# Patient Record
Sex: FEMALE | Race: WHITE | NOT HISPANIC OR LATINO | Employment: FULL TIME | URBAN - METROPOLITAN AREA
[De-identification: names, ages, dates, MRNs, and addresses within clinical notes are randomized per-mention and may not be internally consistent; named-entity substitution may affect disease eponyms.]

---

## 2023-06-02 ENCOUNTER — TELEPHONE (OUTPATIENT)
Dept: GASTROENTEROLOGY | Facility: CLINIC | Age: 48
End: 2023-06-02
Payer: COMMERCIAL

## 2023-06-02 ENCOUNTER — TRANSCRIBE ORDERS (OUTPATIENT)
Dept: OTHER | Age: 48
End: 2023-06-02

## 2023-06-02 DIAGNOSIS — Z12.11 SCREENING FOR COLON CANCER: Primary | ICD-10-CM

## 2023-06-02 NOTE — TELEPHONE ENCOUNTER
Screening Questions  BLUE  KIND OF PREP RED  LOCATION [review exclusion criteria] GREEN  SEDATION TYPE        N Are you active on mychart?       ERICA BARRIENTOS Ordering/Referring Provider?        MEDICA ELECT ESSENTIAL What type of coverage do you have?      N Have you had a positive covid test in the last 14 days?     29.2  1. BMI  [BMI 40+ - review exclusion criteria& smart-phrase document]    Y  2. Are you able to give consent for your medical care? [IF NO,RN REVIEW]          N  3. Are you taking any prescription pain medications on a routine schedule   (ex narcotics: oxycodone, roxicodone, oxycontin,  and percocet)? [RN Review]          3a. EXTENDED PREP What kind of prescription?     N 4. Do you have any chemical dependencies such as alcohol, street drugs, or methadone?        **If yes 3- 5 , please schedule with MAC sedation.**          IF YES TO ANY 6 - 10 - HOSPITAL SETTING ONLY.     N 6.   Do you need assistance transferring?     N 7.   Have you had a heart or lung transplant?    N 8.   Are you currently on dialysis?   N 9.   Do you use daily home oxygen?   N 10. Do you take nitroglycerin?   10a.  If yes, how often?     N 11. Are you currently pregnant?    11a.  If yes, how many weeks? [ Greater than 12 weeks, OR NEEDED]    N 12. Do you have Pulmonary Hypertension? *NEED PAC APPT AT [facility] w/ [facility]*     N 13. [review exclusion criteria]  Do you have any implantable devices in your body (pacemaker, defib, LVAD)?    N 14. In the past 6 months, have you had any heart related issues including cardiomyopathy or heart attack?     14a.  If yes, did it require cardiac stenting if so when?     N 15. Have you had a stroke or Transient ischemic attack (TIA - aka  mini stroke ) within 6 months?      N 16. Do you have mod to severe Obstructive Sleep Apnea?  [Hospital only]    N 17. Do you have SEVERE AND UNCONTROLLED asthma? *NEED PAC APPT AT [facility] w/[facility]*     18.Do you take blood thinners?  No    N 19. Do you take any of  "the following medications?    NPhentermine    NOzempic    NWegovy (Semaglutide)      19a. If yes, \"Hold for 7 days before procedure.  Please consult your prescribing provider if you have questions about holding this medication.\"     N  20. Do you have chronic kidney disease?      N  21. Do you have a diagnosis of diabetes?     N  22. On a regular basis do you go 3-5 days between bowel movements?      23. Preferred LOCAL Pharmacy for Pre Prescription      [facility]      - CLOSING REMINDERS -    You will receive a call from a Nurse to review instructions and health history.  This assessment must be completed prior to your procedure.  Failure to complete the Nurse assessment may result in the procedure being cancelled.      On the day of your procedure, please designatean adult(s) who can drive you home stay with you for the next 24 hours. The medicines used in the exam will make you sleepy. You will not be able to drive.      You cannot take public transportation, ride share services, or non-medical taxi service without a responsible caregiver.  Medical transport services are allowed with the requirement that a responsible caregiver will receive you at your destination.  We require that drivers and caregivers are confirmed prior to your procedure.      - SCHEDULING DETAILS -  NO &  Hospital Setting Required & If yes, what is the exclusion?   ARTEM  Surgeon    7/24  Date of Procedure  Lower Endoscopy [Colonoscopy]  Type of Procedure Scheduled  [facility] Location   MIRALAX GATORADE WITHOUT MAGNEISUM CITRATE Which Colonoscopy Prep was Sent?     CS Sedation Type     N PAC / Pre-op Required     "

## 2023-06-06 ENCOUNTER — ANCILLARY PROCEDURE (OUTPATIENT)
Dept: MAMMOGRAPHY | Facility: CLINIC | Age: 48
End: 2023-06-06
Attending: NURSE PRACTITIONER
Payer: COMMERCIAL

## 2023-06-06 DIAGNOSIS — Z12.31 SCREENING MAMMOGRAM FOR BREAST CANCER: ICD-10-CM

## 2023-06-06 PROCEDURE — 77067 SCR MAMMO BI INCL CAD: CPT | Performed by: RADIOLOGY

## 2023-07-12 ENCOUNTER — TELEPHONE (OUTPATIENT)
Dept: GASTROENTEROLOGY | Facility: CLINIC | Age: 48
End: 2023-07-12
Payer: COMMERCIAL

## 2023-07-12 NOTE — TELEPHONE ENCOUNTER
Communication requested via unencrypted email. This information includes prep instructions for upcoming procedure.  has acknowledged and agreed to accept the risks to receive unencrypted communications for this incidence. Email sent to: [email or web address]    Pre assessment completed for upcoming procedure.   (Please see previous telephone encounter notes for complete details)    Patient  returned call.       Procedure details:    Arrival time and facility location reviewed    Pre op exam needed? N/A    Designated  policy reviewed. Instructed to have someone stay 6 hours post procedure.     COVID policy reviewed.      Medication review:    Medications reviewed. Please see supporting documentation below. Holding recommendations discussed (if applicable).       Prep for procedure:     Reviewed procedure prep instructions.       Additional information needed?  N/A      Patient  verbalized understanding and had no questions or concerns at this time.      Ivana Romero RN  Endoscopy Procedure Pre Assessment RN  [phone] option 4

## 2023-07-12 NOTE — TELEPHONE ENCOUNTER
Pre visit planning completed.      Procedure details:    Patient scheduled for Colonoscopy  on 7/24/23.     Arrival time: 0840. Procedure time 0940    Pre op exam needed? N/A    Facility location: [facility]; [address] [place] [address], [address] Floor, [place] [id number]    Sedation type: Conscious sedation     Indication for procedure: screening       Chart review:     Electronic implanted devices? No    Diabetic? No      Medication review:    Anticoagulants? No    NSAIDS? No    Other medication HOLDING recommendations:  N/A      Prep for procedure:     Bowel prep recommendation: Miralax prep without magnesium citrate   Due to:  standard bowel prep.    Prep instructions sent via letter at time of scheduling.        Kalpana Pace RN  Endoscopy Procedure Pre Assessment RN  [phone] option 4

## 2023-07-12 NOTE — TELEPHONE ENCOUNTER
Attempted to contact patient in order to complete pre assessment questions.     Patient currently busy and unable to talk. Patient will call back to [phone] option #4.     Leslie Adams RN  Endoscopy Procedure Pre Assessment RN

## 2023-07-24 ENCOUNTER — HOSPITAL ENCOUNTER (OUTPATIENT)
Facility: CLINIC | Age: 48
Discharge: HOME | End: 2023-07-24
Attending: INTERNAL MEDICINE | Admitting: INTERNAL MEDICINE
Payer: COMMERCIAL

## 2023-07-24 VITALS
RESPIRATION RATE: 16 BRPM | OXYGEN SATURATION: 98 % | HEART RATE: 68 BPM | DIASTOLIC BLOOD PRESSURE: 66 MMHG | SYSTOLIC BLOOD PRESSURE: 103 MMHG

## 2023-07-24 LAB — COLONOSCOPY: NORMAL

## 2023-07-24 PROCEDURE — 250N000011 HC RX IP 250 OP 636: Performed by: INTERNAL MEDICINE

## 2023-07-24 PROCEDURE — 45378 DIAGNOSTIC COLONOSCOPY: CPT | Performed by: INTERNAL MEDICINE

## 2023-07-24 PROCEDURE — G0500 MOD SEDAT ENDO SERVICE >5YRS: HCPCS | Performed by: INTERNAL MEDICINE

## 2023-07-24 PROCEDURE — G0121 COLON CA SCRN NOT HI RSK IND: HCPCS | Performed by: INTERNAL MEDICINE

## 2023-07-24 RX ORDER — FENTANYL CITRATE 50 UG/ML
INJECTION, SOLUTION INTRAMUSCULAR; INTRAVENOUS PRN
Status: DISCONTINUED | OUTPATIENT
Start: 2023-07-24 | End: 2023-07-24 | Stop reason: HOSPADM

## 2023-07-24 ASSESSMENT — ACTIVITIES OF DAILY LIVING (ADL)
ADLS_ACUITY_SCORE: 35
ADLS_ACUITY_SCORE: 35

## 2023-12-02 ENCOUNTER — OFFICE VISIT (OUTPATIENT)
Dept: URGENT CARE | Facility: URGENT CARE | Age: 48
End: 2023-12-02
Payer: COMMERCIAL

## 2023-12-02 VITALS
WEIGHT: 180 LBS | HEART RATE: 100 BPM | TEMPERATURE: 96.2 F | OXYGEN SATURATION: 100 % | DIASTOLIC BLOOD PRESSURE: 87 MMHG | RESPIRATION RATE: 16 BRPM | SYSTOLIC BLOOD PRESSURE: 146 MMHG

## 2023-12-02 DIAGNOSIS — L08.9 LOCAL INFECTION OF SKIN AND SUBCUTANEOUS TISSUE: Primary | ICD-10-CM

## 2023-12-02 PROCEDURE — 99203 OFFICE O/P NEW LOW 30 MIN: CPT | Performed by: NURSE PRACTITIONER

## 2023-12-02 RX ORDER — DOXYCYCLINE HYCLATE 100 MG
100 TABLET ORAL 2 TIMES DAILY
Qty: 20 TABLET | Refills: 0 | Status: SHIPPED | OUTPATIENT
Start: 2023-12-02 | End: 2024-09-05

## 2023-12-02 ASSESSMENT — ENCOUNTER SYMPTOMS
CHILLS: 0
NAUSEA: 0
WHEEZING: 0
VOMITING: 0
FEVER: 0
COUGH: 0
DIARRHEA: 0
SHORTNESS OF BREATH: 0

## 2023-12-02 NOTE — PROGRESS NOTES
Assessment & Plan     Local infection of skin and subcutaneous tissue  - doxycycline hyclate (VIBRA-TABS) 100 MG tablet  Dispense: 20 tablet; Refill: 0  - NP discussed verbally warm packing the area three times a day   - Patient shower daily to apply warm and keep clean  - If increased redness, swelling or drainage to return to clinic 24 to 48 hours  - Adult General Surg Referral    Return in about 2 days (around 12/4/2023).    [facility] C[facility] [place]    Kostas Faulkner is a 48 year old female who presents to clinic today for the following health issues: Patient states she has had a tender lump on the mid center of the back that has increased size over the last week. Patient has applied warm pack to the area. Patient     Patient noted tender to the touch stayed the same left side of her neck 11/30/2023 last Thursday.  Chief Complaint   Patient presents with    Mass     Bump located mid/center of back,tender for a week and grown in that amount of time,bump size of a grape     Localized skin infection   Onset of swollen area  was 1 week(s) ago.   Course of illness is gradual onset.  Severity moderately severe  Current and Associated symptoms: painful and red   Location of the rash: mid/center of back   Previous history of a similar rash? No  Recent exposure history: none known  Denies exposure to: none known  Associated symptoms include: localized redness and tenderness  Patient denied throat tightness, sore throat, wheezing, shortness of breath, tongue/lip swelling, rhinorrhea, fever, cough, joint pain, nausea, and vomiting.  Treatment measures tried include: applied warm pack    Review of Systems   Constitutional:  Negative for chills and fever.   Respiratory:  Negative for cough, shortness of breath and wheezing.    Cardiovascular:  Negative for chest pain.   Gastrointestinal:  Negative for diarrhea, nausea and vomiting.   Skin:  Negative for rash.         Objective    BP  (!) 146/87   Pulse 100   Temp (!) 96.2  F (35.7  C) (Tympanic)   Resp 16   Wt 81.6 kg (180 lb)   SpO2 100%   Physical Exam  Vitals and nursing note reviewed.   Constitutional:       Appearance: Normal appearance.   HENT:      Head: Normocephalic and atraumatic.   Eyes:      Conjunctiva/sclera: Conjunctivae normal.   Cardiovascular:      Rate and Rhythm: Normal rate and regular rhythm.   Pulmonary:      Effort: Pulmonary effort is normal.      Breath sounds: Normal breath sounds.   Lymphadenopathy:      Cervical: No cervical adenopathy.   Skin:     Comments: Mid thoracic with induration of 4 cm by 2.5 cm within this area is 1.5 cn by 1.5 cm raised erythema with white area non fluctuant    Neurological:      Mental Status: She is alert.

## 2023-12-02 NOTE — PATIENT INSTRUCTIONS
Monitor for signs and symptoms of infection redness swelling and drainage in 24 to 48 hours   
[facility] Smokers Quitline ([id number])

## 2023-12-04 NOTE — TELEPHONE ENCOUNTER
REFERRAL INFORMATION:  Referring Provider: Isabelle Key NP  Referring Clinic: [facility] - Urgent Care  Reason for Visit/Diagnosis: Local infection of skin and subcutaneous tissue       FUTURE VISIT INFORMATION:  Appointment Date: 12/8/2023  Appointment Time: 9 AM     NOTES RECORD STATUS  DETAILS   OFFICE NOTE from Referring Provider Internal [facility]:  12/2/23 -  OV with Isabelle Key NP   OFFICE NOTE from Other Specialists N/A    HOSPITAL DISCHARGE SUMMARY/ ED VISITS  N/A    OPERATIVE REPORT N/A

## 2023-12-07 ENCOUNTER — PATIENT OUTREACH (OUTPATIENT)
Dept: SURGERY | Facility: CLINIC | Age: 48
End: 2023-12-07
Payer: COMMERCIAL

## 2023-12-07 NOTE — PROGRESS NOTES
Patient Telephone Reminder Call    Date of call:  12/07/23  Phone numbers:  Cell number on file:    Telephone Information:   Mobile [phone]       Reached patient/confirmed appointment:  Yes  Appointment with:   Dr. Laurie Enriquez  Reason for visit:  consult for localize skin infection, possible I&D  Remind patient that this visit is a consultation only, NO procedure:  No  Can this visit be changed to a video visit:  No

## 2023-12-08 ENCOUNTER — OFFICE VISIT (OUTPATIENT)
Dept: SURGERY | Facility: CLINIC | Age: 48
End: 2023-12-08
Attending: NURSE PRACTITIONER
Payer: COMMERCIAL

## 2023-12-08 ENCOUNTER — PRE VISIT (OUTPATIENT)
Dept: SURGERY | Facility: CLINIC | Age: 48
End: 2023-12-08

## 2023-12-08 VITALS
BODY MASS INDEX: 31.04 KG/M2 | SYSTOLIC BLOOD PRESSURE: 130 MMHG | HEIGHT: 64 IN | HEART RATE: 83 BPM | WEIGHT: 181.8 LBS | DIASTOLIC BLOOD PRESSURE: 89 MMHG | OXYGEN SATURATION: 100 %

## 2023-12-08 DIAGNOSIS — L08.9 LOCAL INFECTION OF SKIN AND SUBCUTANEOUS TISSUE: ICD-10-CM

## 2023-12-08 PROCEDURE — 10060 I&D ABSCESS SIMPLE/SINGLE: CPT | Performed by: SURGERY

## 2023-12-08 ASSESSMENT — PAIN SCALES - GENERAL: PAINLEVEL: NO PAIN (0)

## 2023-12-08 NOTE — LETTER
Date:December 28, 2023      Provider requested that no letter be sent. Do not send.       [facility]

## 2023-12-08 NOTE — PATIENT INSTRUCTIONS
You met with Dr. Shan Cheng.      Today's visit instructions:     We will call you Monday or Tuesday for a status update.     If you have questions please contact Surekha RN or Courtney RN during regular clinic hours, Monday through Friday 7:30 AM - 4:00 PM, or you can contact us via [employer] at anytime.       If you have urgent needs after-hours, weekends, or holidays please call the hospital at [phone] and ask to speak with our on-call General Surgery Team.    Appointment [phone]  Nurse Advice (Surekha or Courtney): [phone]   Surgery Scheduler (Faith): [phone]  Fax: [phone]    Mass/Lump Removal        Incision care   You may take a shower the day after surgery. Carefully wash your incision with soap and water. Do not submerge yourself in water (bath, whirlpool, hot tub, pool, lake) for 14 days after surgery.   Remove the gauze bandage 1-2 days after surgery.  Always wash your hands before touching your incisions or removing bandages.   Reapply a new dressing after taking a shower. Do this daily and as needed until healed.      Other medicines   Wait to start aspirin or blood thinners until the day after surgery. You can continue your regular medicines at your normal time the day after surgery.   Your pain medicine may cause constipation (hard, dry stools). To help with this, take the stool softener your doctor gave you or an over-the-counter stool softener or laxative. You can stop taking this when you are no longer taking pain medicine and your bowel movements are back to normal.      For pain or discomfort   Please use over-the-counter medication, use acetaminophen (Tylenol) or ibuprofen (Advil, Motrin) as instructed on the box for discomfort. If you were prescribed narcotic pain medicine, take as needed and as instructed on the bottle (narcotics are not always prescribed for this procedure). Do not take Tylenol if it is in your narcotic pain medication.    Use an ice pack on your  surgical cut (incision) for 20 minutes at a time as needed for the first 24 hours. Be sure to protect your skin by putting a cloth between the ice pack and your skin.      When to call the doctor   Call your doctor if you have:   A fever above 101 F (38.3 C) (taken under the tongue), or a fever or chills lasting more than a day.   Redness at the incision site.   Any fluid or blood draining from the incision, especially if it smells bad.    Severe pain that doesn t improve with pain medicine.

## 2023-12-08 NOTE — NURSING NOTE
"Chief Complaint   Patient presents with    New Patient     Local infection of skin and subcutaneous tissue       Vitals:    12/08/23 0904   BP: 130/89   BP Location: Left arm   Patient Position: Sitting   Cuff Size: Adult Regular   Pulse: 83   SpO2: 100%   Weight: 82.5 kg (181 lb 12.8 oz)   Height: 1.626 m (5' 4\")       Body mass index is 31.21 kg/m .                          Dave Rojas, EMT    "

## 2023-12-08 NOTE — NURSING NOTE
[facility] [address] [address]  [phone]  Dept: [phone]  ______________________________________________________________________________    Patient: Lucie Robertson   : 1975   MRN: [id number]   2023    INVASIVE PROCEDURE SAFETY CHECKLIST    Date: 2023   Procedure: incision and drainage of back abscess  Patient Name: Lucie Robertson  MRN: [id number]  YOB: 1975    Action: Complete sections as appropriate. Any discrepancy results in a HARD COPY until resolved.     PRE PROCEDURE:  Patient ID verified with 2 identifiers (name and  or MRN): Yes  Procedure and site verified with patient/designee (when able): Yes  Accurate consent documentation in medical record: Yes  H&P (or appropriate assessment) documented in medical record: NA  H&P must be up to 30 days prior to procedure and updates within 24 hours of procedure as applicable: NA  Relevant diagnostic and radiology test results appropriately labeled and displayed as applicable: NA  Blood products, implants, devices, and or special equipment available for the procedure as applicable: NA   Procedure site(s) marked with provider initials: NA  Marking not required: Yes  Procedure does not require site marking    TIMEOUT:  Time-Out performed immediately prior to starting procedure, including verbal and active participation of all team members addressing the following:Yes  * Correct patient identify  * Confirmed that the correct side and site are marked  * An accurate procedure consent form  * Agreement on the procedure to be done  * Correct patient position  * Relevant images and results are properly labeled and appropriately displayed  * The need to administer antibiotics or fluids for irrigation purposes during the procedure as applicable   * Safety precautions based on patient history or medication use    DURING PROCEDURE: Verification of  correct person, site, and procedures any time the responsibility for care of the patient is transferred to another member of the care team.

## 2023-12-08 NOTE — NURSING NOTE
Patient was informed that she may need to have the cyst completely excised at some point once the inflammation/infection resolves. She will call the Nurse Advice line if she decides to proceed with this.

## 2023-12-12 ENCOUNTER — PATIENT OUTREACH (OUTPATIENT)
Dept: SURGERY | Facility: CLINIC | Age: 48
End: 2023-12-12
Payer: COMMERCIAL

## 2023-12-12 NOTE — PROGRESS NOTES
RN Post-Op/Post-Discharge Care Coordination Note    Ms. Lucie Robertson is a 48 year old female who underwent incision and drainage of back abscess on 12/8 with  Dr. Shan Cheng.  Spoke with Patient.    Support  Patient able to care for self independently.     Health Status  Fevers/chills: Patient denies any fever or chills.  Incisions: Patient denies any signs and symptoms of infection. Cleansing the incision daily in the shower and applying cover dressing. Incision is healing.   Pain: denies pain  New Medications:  None    Activity/Restrictions  No restrictions    Equipment  None    Pathology reviewed with patient:  N/A    Forms/Letters  No    All of her questions were answered including reviewing wound care.  She will call this office if she has any further questions and/or concerns.  She will call if she decides to have the back mass excised once the area is completely healed.     A copy of this note was routed to the primary surgeon.      Whom and When to Call  Patient acknowledges understanding of how to manage any medication changes and when to seek medical care.     Patient advised that if after hour medical concerns arise to please call [phone] and choose option 4 to speak to the physician on call.

## 2023-12-12 NOTE — PROCEDURES
[unfilled]    Incision and drainage    Date/Time: 12/8/2023 9:00 AM    Performed by: Shan Cheng MD  Authorized by: Laurie Enriquez MD      UNIVERSAL PROTOCOL   Site Marked: No  Prior Images Obtained and Reviewed:  No  Required items: Required blood products, implants, devices and special equipment available    Patient identity confirmed:  Verbally with patient  NA - No sedation, light sedation, or local anesthesia  Confirmation Checklist:  Patient's identity using two indicators  Time out: Immediately prior to the procedure a time out was called    Universal Protocol: the Joint Commission Universal Protocol was followed    Preparation: Patient was prepped and draped in usual sterile fashion    ESBL (mL):  1    ANESTHESIA  local infiltration  Local anesthesia used: yes  Local Anesthetic: lidocaine 1% without epinephrine  Anesthetic total: 5 mL      SEDATION    Patient Sedated: No      Type: abscess (abscess (infected cyst))  Location: middle back.  Scalpel size: 11  Incision type: single straight  Incision depth: dermal  Complexity: simple  Drainage: purulent  Drainage amount: moderate  Wound treatment: wound left open  Packing material: 1/4 in gauze      PROCEDURE  Describe Procedure: Patient presented with mid back abscess from infected cyst. After injection of local anesthetic, incision made over area of fluctuance and purulent material expressed. The wound was then irrigated and packed. There were no immediate complications.  : none.  Length of time physician/provider present for 1:1 monitoring during sedation: 0 (no sedation used)

## 2023-12-12 NOTE — PROGRESS NOTES
"  Assessment & Plan   Problem List Items Addressed This Visit    None  Visit Diagnoses       Local infection of skin and subcutaneous tissue        Relevant Medications    lidocaine 1 % 10 mL (Completed)    Other Relevant Orders    Incision and drainage (Completed)             Review of external notes as documented elsewhere in note         BMI:   Estimated body mass index is 31.21 kg/m  as calculated from the following:    Height as of this encounter: 1.626 m (5' 4\").    Weight as of this encounter: 82.5 kg (181 lb 12.8 oz).           No follow-ups on file.    JANET KRAUSE MD  [facility] DEMETRI Kincaid is a 48 year old, presenting for the following health issues:  New Patient (Local infection of skin and subcutaneous tissue)    HPI     Over the Thanksgiving holiday the patient noticed a \"bump\" on her lower mid back. She complains it has been tender and painful. A couple of days ago it started draining pus. Per patient and chart review, she saw her PCP who prescribed doxycycline and referred her to surgery clinic for incision and drainage due to the location over her spine. She says since she started taking the antibiotic the infection has improved some, but is still draining purulent fluid. She denies systemic          Objective    /89 (BP Location: Left arm, Patient Position: Sitting, Cuff Size: Adult Regular)   Pulse 83   Ht 1.626 m (5' 4\")   Wt 82.5 kg (181 lb 12.8 oz)   SpO2 100%   BMI 31.21 kg/m    Body mass index is 31.21 kg/m .  Physical Exam   GENERAL: healthy, alert and no distress  NECK: no adenopathy, no asymmetry, masses, or scars and thyroid normal to palpation  RESP: lungs clear to auscultation - no rales, rhonchi or wheezes  CV: regular rate and rhythm, normal S1 S2, no S3 or S4, no murmur, click or rub, no peripheral edema and peripheral pulses strong  ABDOMEN: soft, nontender, no hepatosplenomegaly, no masses and bowel sounds normal  MS: " no gross musculoskeletal defects noted, no edema  BACK: approx. 1.5 x 1.5 cm skin abscess in lower mid back c/w infected cyst            A&P: Patient presents with an infected cyst on her back. She has no signs or symptoms of systemic infection. This is an infected dermal cyst, there is no concern on exam for underlying involvement beyond the skin. Recommend incision & drainage under local anesthetic. Risks/benefits/alternatives dicussed with patient, she elects to proceed with I&D, informed consent obtained. Counseled her to go ahead and complete her antibiotics course and that once this is healed she may wish to have the cyst excised to prevent recurrent abscesses.

## 2023-12-31 ENCOUNTER — HEALTH MAINTENANCE LETTER (OUTPATIENT)
Age: 48
End: 2023-12-31

## 2024-06-10 ENCOUNTER — ANCILLARY PROCEDURE (OUTPATIENT)
Dept: MAMMOGRAPHY | Facility: CLINIC | Age: 49
End: 2024-06-10
Payer: COMMERCIAL

## 2024-06-10 DIAGNOSIS — Z12.31 VISIT FOR SCREENING MAMMOGRAM: ICD-10-CM

## 2024-06-10 PROCEDURE — 77067 SCR MAMMO BI INCL CAD: CPT | Mod: TC | Performed by: STUDENT IN AN ORGANIZED HEALTH CARE EDUCATION/TRAINING PROGRAM

## 2024-06-10 PROCEDURE — 77063 BREAST TOMOSYNTHESIS BI: CPT | Mod: TC | Performed by: STUDENT IN AN ORGANIZED HEALTH CARE EDUCATION/TRAINING PROGRAM

## 2024-06-17 ENCOUNTER — ANCILLARY PROCEDURE (OUTPATIENT)
Dept: MAMMOGRAPHY | Facility: CLINIC | Age: 49
End: 2024-06-17
Attending: FAMILY MEDICINE
Payer: COMMERCIAL

## 2024-06-17 DIAGNOSIS — R92.8 ABNORMAL MAMMOGRAM: ICD-10-CM

## 2024-06-17 PROCEDURE — 77065 DX MAMMO INCL CAD UNI: CPT | Mod: RT | Performed by: RADIOLOGY

## 2024-06-17 PROCEDURE — G0279 TOMOSYNTHESIS, MAMMO: HCPCS | Performed by: RADIOLOGY

## 2024-06-17 PROCEDURE — 76642 ULTRASOUND BREAST LIMITED: CPT | Mod: RT | Performed by: RADIOLOGY

## 2024-06-19 ENCOUNTER — ANCILLARY PROCEDURE (OUTPATIENT)
Dept: MAMMOGRAPHY | Facility: CLINIC | Age: 49
End: 2024-06-19
Attending: FAMILY MEDICINE
Payer: COMMERCIAL

## 2024-06-19 DIAGNOSIS — R92.8 ABNORMAL MAMMOGRAM: ICD-10-CM

## 2024-06-19 PROCEDURE — 88342 IMHCHEM/IMCYTCHM 1ST ANTB: CPT | Mod: 26 | Performed by: STUDENT IN AN ORGANIZED HEALTH CARE EDUCATION/TRAINING PROGRAM

## 2024-06-19 PROCEDURE — 88305 TISSUE EXAM BY PATHOLOGIST: CPT | Mod: 26 | Performed by: STUDENT IN AN ORGANIZED HEALTH CARE EDUCATION/TRAINING PROGRAM

## 2024-06-19 PROCEDURE — 19083 BX BREAST 1ST LESION US IMAG: CPT | Mod: RT | Performed by: RADIOLOGY

## 2024-06-19 PROCEDURE — 88305 TISSUE EXAM BY PATHOLOGIST: CPT | Mod: TC | Performed by: FAMILY MEDICINE

## 2024-06-21 ENCOUNTER — TELEPHONE (OUTPATIENT)
Dept: MAMMOGRAPHY | Facility: CLINIC | Age: 49
End: 2024-06-21
Payer: COMMERCIAL

## 2024-06-21 NOTE — TELEPHONE ENCOUNTER
Spoke to Codi about the benign finding of a fibroadenoma.  We discussed the Radiologist's recommendation of continuing on with her yearly screening mammograms.  Codi verbalized understanding.

## 2024-06-24 LAB
PATH REPORT.ADDENDUM SPEC: NORMAL
PATH REPORT.COMMENTS IMP SPEC: NORMAL
PATH REPORT.COMMENTS IMP SPEC: NORMAL
PATH REPORT.FINAL DX SPEC: NORMAL
PATH REPORT.GROSS SPEC: NORMAL
PATH REPORT.MICROSCOPIC SPEC OTHER STN: NORMAL
PATH REPORT.RELEVANT HX SPEC: NORMAL
PHOTO IMAGE: NORMAL

## 2024-07-08 ENCOUNTER — ANCILLARY PROCEDURE (OUTPATIENT)
Dept: CT IMAGING | Facility: CLINIC | Age: 49
End: 2024-07-08
Attending: FAMILY MEDICINE
Payer: COMMERCIAL

## 2024-07-08 DIAGNOSIS — R51.9 FACIAL PAIN: ICD-10-CM

## 2024-07-08 PROCEDURE — 70486 CT MAXILLOFACIAL W/O DYE: CPT | Mod: GC | Performed by: RADIOLOGY

## 2024-07-23 ENCOUNTER — MEDICAL CORRESPONDENCE (OUTPATIENT)
Dept: HEALTH INFORMATION MANAGEMENT | Facility: CLINIC | Age: 49
End: 2024-07-23
Payer: COMMERCIAL

## 2024-07-25 ENCOUNTER — TRANSCRIBE ORDERS (OUTPATIENT)
Dept: OTHER | Age: 49
End: 2024-07-25

## 2024-07-25 DIAGNOSIS — Z84.81 FAMILY HISTORY OF GENETIC DISEASE CARRIER: Primary | ICD-10-CM

## 2024-08-29 ENCOUNTER — NURSE TRIAGE (OUTPATIENT)
Dept: NURSING | Facility: CLINIC | Age: 49
End: 2024-08-29
Payer: COMMERCIAL

## 2024-08-30 NOTE — TELEPHONE ENCOUNTER
"Codi went for a run tonight around 8 pm.  When she was cooling down she noticed a nagging pain between her breasts.  Got home and fine.  Now is still having pain in chest towards the arm pit.    Reason for Disposition   Dizziness or lightheadedness    Additional Information   Negative: SEVERE difficulty breathing (e.g., struggling for each breath, speaks in single words)   Negative: Difficult to awaken or acting confused (e.g., disoriented, slurred speech)   Negative: Shock suspected (e.g., cold/pale/clammy skin, too weak to stand, low BP, rapid pulse)   Negative: Passed out (i.e., lost consciousness, collapsed and was not responding)   Negative: Chest pain lasting longer than 5 minutes and ANY of the following:    history of heart disease  (i.e., heart attack, bypass surgery, angina, angioplasty, CHF; not just a heart murmur)    described as crushing, pressure-like, or heavy    age > 50    age > 30 AND at least one cardiac risk factor (i.e., hypertension, diabetes, obesity, smoker or strong family history of heart disease)    not relieved with nitroglycerin   Negative: Heart beating < 50 beats per minute OR > 140 beats per minute   Negative: Visible sweat on face or sweat dripping down face   Negative: Sounds like a life-threatening emergency to the triager   Negative: Followed a chest injury   Negative: SEVERE chest pain   Negative: [1] Chest pain (or \"angina\") comes and goes AND [2] is happening more often (increasing in frequency) or getting worse (increasing in severity)  (Exception: Chest pains that last only a few seconds.)   Negative: Pain also in shoulder(s) or arm(s) or jaw  (Exception: Pain is clearly made worse by movement.)   Negative: Difficulty breathing    Protocols used: Chest Pain-A-AH    "

## 2024-09-05 ENCOUNTER — OFFICE VISIT (OUTPATIENT)
Dept: URGENT CARE | Facility: URGENT CARE | Age: 49
End: 2024-09-05
Payer: COMMERCIAL

## 2024-09-05 VITALS
DIASTOLIC BLOOD PRESSURE: 94 MMHG | BODY MASS INDEX: 31.76 KG/M2 | SYSTOLIC BLOOD PRESSURE: 150 MMHG | WEIGHT: 185 LBS | TEMPERATURE: 98 F | OXYGEN SATURATION: 100 % | HEART RATE: 84 BPM

## 2024-09-05 DIAGNOSIS — M79.652 PAIN OF LEFT THIGH: Primary | ICD-10-CM

## 2024-09-05 PROCEDURE — 99214 OFFICE O/P EST MOD 30 MIN: CPT | Performed by: NURSE PRACTITIONER

## 2024-09-06 NOTE — PROGRESS NOTES
Assessment & Plan   Pain of left thigh       Recommend further evaluation in emergency room for left thigh pain new onset as cannot rule out DVT in urgent care. Patient agreeable and is discharged in stable condition.         Jenifer Mccord NP  [facility]    Kostas Kincaid is a 49 year old female who presents to clinic today with her mom for the following health issues:  Chief Complaint   Patient presents with    Musculoskeletal Problem     Left Inner thigh hurt when walking or pressing hard on it.     MS Injury/Pain    Onset of symptoms was earlier today  Location: left inner thigh  Context: No injury  Course of symptoms is same.    Severity moderate  Current and Associated symptoms: Pain did have redness after applying heating pad and for several hours afterwards  Denies  Swelling, Bruising, Tenderness, and Decreased range of motion  Aggravating Factors: walking and pressing on it  Therapies to improve symptoms include: heating pad   This is the first time this type of problem has occurred for this patient.   Denies shortness of breath.   She is wondering if it could be a blood clot  No increase in physical activity is a runner and has not run too recently.     Problem list, Medication list, Allergies, and Medical history reviewed in EPIC.    ROS:  Review of systems negative except for noted above        Objective    BP (!) 150/94 (BP Location: Right arm, Patient Position: Sitting, Cuff Size: Adult Large)   Pulse 84   Temp 98  F (36.7  C) (Tympanic)   Wt 83.9 kg (185 lb)   SpO2 100%   BMI 31.76 kg/m    Physical Exam  Constitutional:       General: She is not in acute distress.     Appearance: She is not toxic-appearing or diaphoretic.   Pulmonary:      Effort: Pulmonary effort is normal. No respiratory distress.   Musculoskeletal:      Right lower leg: No edema.      Left lower leg: No edema.      Comments: Tenderness with palpation left inner thigh   Skin:     General:  Skin is warm and dry.      Findings: No bruising or erythema.   Neurological:      Mental Status: She is alert.      Sensory: No sensory deficit.

## 2024-09-13 ENCOUNTER — OFFICE VISIT (OUTPATIENT)
Dept: FAMILY MEDICINE | Facility: CLINIC | Age: 49
End: 2024-09-13
Payer: COMMERCIAL

## 2024-09-13 ENCOUNTER — MEDICAL CORRESPONDENCE (OUTPATIENT)
Dept: HEALTH INFORMATION MANAGEMENT | Facility: CLINIC | Age: 49
End: 2024-09-13

## 2024-09-13 VITALS
HEART RATE: 62 BPM | SYSTOLIC BLOOD PRESSURE: 145 MMHG | RESPIRATION RATE: 16 BRPM | DIASTOLIC BLOOD PRESSURE: 91 MMHG | TEMPERATURE: 98 F | OXYGEN SATURATION: 100 %

## 2024-09-13 DIAGNOSIS — R20.2 FACIAL TINGLING: Primary | ICD-10-CM

## 2024-09-13 PROCEDURE — 99203 OFFICE O/P NEW LOW 30 MIN: CPT

## 2024-09-13 RX ORDER — LEVOFLOXACIN 500 MG/1
TABLET, FILM COATED ORAL
COMMUNITY
Start: 2024-09-13

## 2024-09-14 NOTE — PROGRESS NOTES
Assessment & Plan       ICD-10-CM    1. Facial tingling  R20.2 Adult Neurology  Referral         Codi is having ongoing sinus issues that are being managed by her primary team. She's starting Levofloxacin tomorrow for the recurrent symptoms. Intermittently over the summer she's been having left sided facial tingling. Low concern for stroke given lack of weakness, speech changes, vision changes, the intermittent nature, and patient's overall health. Considered temporal arteritis, trigeminal neuralgia, and other facial pathologies that can cause nerve symptoms - less likely given it's the entire side of the face and there is no pain or electric sensation and no headaches. Considered less common systemic illnesses like MS, which are certainly possible, but this seems to have started with and continued with the sinus symptoms, so I'm leaning towards this being related to inflammation from this ongoing sinus issue. However, if the tingling persists or she gets any new or changing symptoms, I would want her seen by neurology. Referral placed and will schedule.     Follow up with primary care provider with any problems, questions or concerns or if symptoms worsen or fail to improve. Patient agreed to plan and verbalized understanding.     Subjective     Codi is a 49 year old female who presents to clinic today for the following health issues:  Chief Complaint   Patient presents with    Sinus Problem     Sinus pain on and off but flare up again yesterday. Tingle Lt side face noticed today.     HPI    Beginning of summer had infected tooth extracted and then had subsequent sinus infection and was treated with antibiotics. Has had intermittent sinus symptoms since then. Worsening at end of summer and was doing sinus rinses which helped. Was seen by PCP recently and WBC were high. Plan was to do sinus rinses for a week or two and see if the symptoms resolve. Just stopped sinus rinses and symptoms were back, so  regular physician sent antibiotics. Does have some tingling in the L side of the face. Hasn't started the antibiotic yet. Complicated summer medically.     L face mild pins and needles - forehead, cheek and jaw. Comes and goes. No heart racing. Does feel some pressure behind the eye.     Review of Systems    10 point ROS performed and negative except as noted in HPI.     Problem List:  There are no relevant problems documented for this patient.      No past medical history on file.    Social History     Tobacco Use    Smoking status: Never    Smokeless tobacco: Never   Substance Use Topics    Alcohol use: Not on file           Objective    BP (!) 145/91   Pulse 62   Temp 98  F (36.7  C) (Oral)   Resp 16   SpO2 100%   Physical Exam   Constitutional:       General: Patient is not in acute distress.     Appearance: Normal appearance.   HENT:      Head: Normocephalic and atraumatic.      Right Ear: External ear normal.      Left Ear: External ear normal.      Nose: No congestion, rhinorrhea.      Mouth/Throat:      Mouth: Mucous membranes are moist.      Pharynx: Oropharynx is clear, No exudate.   Eyes:      General: No scleral icterus.     Extraocular Movements: Extraocular movements intact.      Conjunctiva/sclera: Conjunctivae normal.      Pupils: Pupils are equal, round, and reactive to light.   Pulmonary:      Effort: Pulmonary effort is normal.   Cardiovascular:      Regular heart rate  Abdominal:      General: Abdomen is flat.   Musculoskeletal:         General: No swelling or deformity. Normal range of motion.      Cervical back: Normal range of motion and neck supple.   Skin:     General: Skin is warm and dry.      Coloration: Skin is not jaundiced.      Findings: No bruising, lesion or rash.   Neurological:      General: No focal deficit present. No facial droop, no vision changes, no slurred speech, no confusion, no word-finding difficulty, no twitch or pain, no overlying skin changes.     Mental Status:  Patient is alert. Mental status is at baseline.   Psychiatric:         Mood and Affect: Mood normal.         Behavior: Behavior normal.         Thought Content: Thought content normal.       Dayanara Ruiz MD

## 2024-09-25 ENCOUNTER — MEDICAL CORRESPONDENCE (OUTPATIENT)
Dept: HEALTH INFORMATION MANAGEMENT | Facility: CLINIC | Age: 49
End: 2024-09-25
Payer: COMMERCIAL

## 2024-09-26 ENCOUNTER — MEDICAL CORRESPONDENCE (OUTPATIENT)
Dept: HEALTH INFORMATION MANAGEMENT | Facility: CLINIC | Age: 49
End: 2024-09-26
Payer: COMMERCIAL

## 2024-10-01 NOTE — TELEPHONE ENCOUNTER
FUTURE VISIT INFORMATION      FUTURE VISIT INFORMATION:  Date: 10/31/24  Time: 3:20pm  Location: [facility]  REFERRAL INFORMATION:  Referring provider:  Surekha Black NP   Referring providers clinic:  [facility]   Reason for visit/diagnosis  Per Pt, dx chronic sinusitis referral from Surekha Black recs in [facility]     RECORDS REQUESTED FROM:       Clinic name Comments Records Status Imaging Status   [facility]   9/26/24- ov Surekha Viera NP  10/1 - pending   [facility] 10/1    [facility] Ov Dayanara Diaz MD  Epic     Imaging  7/8/24- CT Sinus  [facility]  PACS            October 1, 2024 12:40 PM - Faxed a request to [facility] for records - [place]   October 1, 2024 3:07 PM- Received records from [facility] and sent it to mari- [place]achelle

## 2024-10-31 ENCOUNTER — PRE VISIT (OUTPATIENT)
Dept: OTOLARYNGOLOGY | Facility: CLINIC | Age: 49
End: 2024-10-31

## 2024-10-31 ENCOUNTER — OFFICE VISIT (OUTPATIENT)
Dept: OTOLARYNGOLOGY | Facility: CLINIC | Age: 49
End: 2024-10-31
Payer: COMMERCIAL

## 2024-10-31 VITALS
HEIGHT: 64 IN | DIASTOLIC BLOOD PRESSURE: 89 MMHG | HEART RATE: 81 BPM | BODY MASS INDEX: 31.58 KG/M2 | TEMPERATURE: 79 F | WEIGHT: 185 LBS | OXYGEN SATURATION: 100 % | SYSTOLIC BLOOD PRESSURE: 135 MMHG

## 2024-10-31 DIAGNOSIS — J01.01 ACUTE RECURRENT MAXILLARY SINUSITIS: ICD-10-CM

## 2024-10-31 DIAGNOSIS — J34.89 SINUS PAIN: ICD-10-CM

## 2024-10-31 DIAGNOSIS — J01.91 ACUTE RECURRENT SINUSITIS, UNSPECIFIED LOCATION: Primary | ICD-10-CM

## 2024-10-31 PROCEDURE — 99203 OFFICE O/P NEW LOW 30 MIN: CPT | Mod: 25 | Performed by: STUDENT IN AN ORGANIZED HEALTH CARE EDUCATION/TRAINING PROGRAM

## 2024-10-31 PROCEDURE — 87075 CULTR BACTERIA EXCEPT BLOOD: CPT | Performed by: STUDENT IN AN ORGANIZED HEALTH CARE EDUCATION/TRAINING PROGRAM

## 2024-10-31 PROCEDURE — 87070 CULTURE OTHR SPECIMN AEROBIC: CPT | Performed by: STUDENT IN AN ORGANIZED HEALTH CARE EDUCATION/TRAINING PROGRAM

## 2024-10-31 PROCEDURE — 31231 NASAL ENDOSCOPY DX: CPT | Performed by: STUDENT IN AN ORGANIZED HEALTH CARE EDUCATION/TRAINING PROGRAM

## 2024-10-31 PROCEDURE — 99000 SPECIMEN HANDLING OFFICE-LAB: CPT | Performed by: PATHOLOGY

## 2024-10-31 NOTE — PATIENT INSTRUCTIONS
You were seen in the ENT Clinic today by Dr. Fernández. If you have any questions or concerns after your appointment, please contact us (see below)       2.   The following recommendations have been made based upon your appointment today:   -Obtain CT scan at your convenience. We will follow up once the results have been reviewed and determine a plan from there.   -We have also sent a culture to the lab and we will follow up with you on those results once Dr. Fernández has reviewed.             How to Contact Us:  Send a [employer] message to your provider. Our team will respond to you via [employer]. Occasionally, we will need to call you to get further information.  For urgent matters (Monday-Friday), call the ENT Clinic: [phone] and speak with a call center team member - they will route your call appropriately.   If you'd like to speak directly with a nurse, please find our contact information below. We do our best to check voicemail frequently throughout the day, and will work to call you back within 1-2 days. For urgent matters, please use the general clinic phone numbers listed above.     Cathi ORANTES RN  Direct: [phone]  Shiloh VASQUEZ LPN  Direct: [phone]         [facility]  Department of Otolaryngology

## 2024-10-31 NOTE — LETTER
10/31/2024       RE: Lucie Robertson  [address] [facility] [facility] [address]     Dear Colleague,    Thank you for referring your patient, Lucie Robertson, to the [facility] EAR NOSE AND THROAT CLINIC [place] at [facility]. Please see a copy of my visit note below.      [facility]  New Patient Visit      Encounter date:   October 31, 2024    Referring Provider:   Surekha Black NP  [facility]  [address] [place] [address]    Chief Complaint: Consult    History of Present Illness:   Lucie Robertson is a 49 year old female who presents for consultation regarding left-sided sinus concerns.  Per patient states this all began in the summer with a infected left maxillary tooth.  Had previously had a root canal but a portion of the root broke off and so decision was made to pull the tooth.  Afterwards patient had symptoms of a left sinus infection additionally had left headache and was treated with Augmentin.  Symptoms including headache improved but since that time has noticed some pain over her left eyebrow and recurrent left sinus concerns.  Had a time where had tingling over the left side of her face but this has improved.  Had a CT scan done in the summer but it was right after she was on antibiotics and felt better.    At this point unsure if this is related to the sinus or possibly some sequela of the tooth.  Uses sinus rinses daily which do greatly help the symptoms but she has to use them every day.  For all of this started had never had any real issues with her sinuses before and also endorses no history of sinus surgery.  History of underlying autoimmune conditions or immunodeficiency    Review of systems: A 14-point review of systems has been conducted and was negative for any notable symptoms, except as dictated in the history of present illness.     Medical History:  No past medical history on file.     Surgical History:  "  Past Surgical History:   Procedure Laterality Date     COLONOSCOPY N/A 7/24/2023    Procedure: Colonoscopy;  Surgeon: Jeffrey Null MD;  Location:  GI        Family History:  No family history on file.     Social History:   Social History     Socioeconomic History     Marital status: Single   Tobacco Use     Smoking status: Never     Smokeless tobacco: Never        Physical Exam:  Vital signs: /89 (BP Location: Left arm, Patient Position: Sitting, Cuff Size: Adult Regular)   Pulse 81   Temp (!) 79  F (26.1  C)   Ht 1.626 m (5' 4\")   Wt 83.9 kg (185 lb)   SpO2 100%   BMI 31.76 kg/m     General Appearance: No acute distress, appropriate demeanor, conversant  Eyes: moist conjunctivae; EOMI; pupils symmetric; visual acuity grossly intact; no proptosis  Head: normocephalic; overall symmetric appearance without deformity  Face: overall symmetric without deformity  Ears: Normal appearance of external ear; external meatus normal in appearance  Nose: No external deformity  Oral Cavity/oropharynx: Normal appearance of mucosa  Neck: no palpable lymphadenopathy; thyroid without palpable nodules  Lungs: symmetric chest rise; no wheezing  CV: Good distal perfusion; normal hear rate  Extremities: No deformity  Neurologic Exam: Cranial nerves II-XII are grossly intact; no focal deficit    Procedure Note  Procedure performed: Rigid nasal endoscopy  Indication: To evaluate for sinonasal pathology not visualized on routine anterior rhinoscopy  Anesthesia: 4% topical lidocaine with 0.05% oxymetazoline  Description of procedure: A 30 degree, 3 mm rigid endoscope was inserted into bilateral nasal cavities and the nasal valves, nasal cavity, middle meatus, sphenoethmoid recess, and nasopharynx were thoroughly evaluated for evidence of obstruction, edema, purulence, polyps and/or mass/lesion.     Maik-Mark Endoscopic Scoring System  Endoscopic observation Right Left   Polyps in middle meatus (0 = absent, 1 = " restricted to middle meatus, 2 = Beyond middle meatus) 0 0   Discharge (0 = absent, 1 = thin and clear, 2 = thick, purulent) 0 1   Edema (0 = absent, 1 = mild-moderate, 2 = moderate-severe) 0 0   Crusting (0 = absent, 1 = mild-moderate, 2 = moderate-severe) 0 0   Scarring (0= absent, 1 = mild-moderate, 2 = moderate-severe) 0 0   Total 0 1     Findings  RT: sinonasal passages patent without evidence of infection, polyps, or mass.   LT: Evidence of thin drainage from the middle meatus into the nasopharynx.      The patient tolerated the procedure well without complication.     Assessment/Medical Decision Makin-year-old female who presents with possible recurrent left-sided sinusitis versus underlying neuropathy possibly from tooth removal/chronic sinus infection    On endoscopy does have some thin drainage which I cultured today.  Will plan for a sinus CT scan after patient has been off the rinses for a few days to see what it looks like while she is actively symptomatic    If demonstrates evidence of a sinus infection we will plan to treat with extended course of antibiotics and oral steroids at the same time.  No evidence of any sinus condition did discuss with patient that we may consider something like a neurology referral    Order a CT scan for 2 weeks from now.    Plan:  Follow-up based on CT scan results    Jens Fernández MD    [facility]  Rhinology  Endoscopic Skull Base Surgery  [facility]  Department of Otolaryngology - Head & Neck Surgery           Again, thank you for allowing me to participate in the care of your patient.      Sincerely,    Jens Fernández MD

## 2024-10-31 NOTE — PROGRESS NOTES
[facility]  New Patient Visit      Encounter date:   October 31, 2024    Referring Provider:   Surekha Black NP  [address] [address]    Chief Complaint: Consult    History of Present Illness:   Lucie Robertson is a 49 year old female who presents for consultation regarding left-sided sinus concerns.  Per patient states this all began in the summer with a infected left maxillary tooth.  Had previously had a root canal but a portion of the root broke off and so decision was made to pull the tooth.  Afterwards patient had symptoms of a left sinus infection additionally had left headache and was treated with Augmentin.  Symptoms including headache improved but since that time has noticed some pain over her left eyebrow and recurrent left sinus concerns.  Had a time where had tingling over the left side of her face but this has improved.  Had a CT scan done in the summer but it was right after she was on antibiotics and felt better.    At this point unsure if this is related to the sinus or possibly some sequela of the tooth.  Uses sinus rinses daily which do greatly help the symptoms but she has to use them every day.  For all of this started had never had any real issues with her sinuses before and also endorses no history of sinus surgery.  History of underlying autoimmune conditions or immunodeficiency    Review of systems: A 14-point review of systems has been conducted and was negative for any notable symptoms, except as dictated in the history of present illness.     Medical History:  No past medical history on file.     Surgical History:   Past Surgical History:   Procedure Laterality Date    COLONOSCOPY N/A 7/24/2023    Procedure: Colonoscopy;  Surgeon: Jeffrey Null MD;  Location: [facility]        Family History:  No family history on file.     Social History:   Social History     Socioeconomic History    Marital status: Single   Tobacco Use    Smoking  "status: Never    Smokeless tobacco: Never        Physical Exam:  Vital signs: /89 (BP Location: Left arm, Patient Position: Sitting, Cuff Size: Adult Regular)   Pulse 81   Temp (!) 79  F (26.1  C)   Ht 1.626 m (5' 4\")   Wt 83.9 kg (185 lb)   SpO2 100%   BMI 31.76 kg/m     General Appearance: No acute distress, appropriate demeanor, conversant  Eyes: moist conjunctivae; EOMI; pupils symmetric; visual acuity grossly intact; no proptosis  Head: normocephalic; overall symmetric appearance without deformity  Face: overall symmetric without deformity  Ears: Normal appearance of external ear; external meatus normal in appearance  Nose: No external deformity  Oral Cavity/oropharynx: Normal appearance of mucosa  Neck: no palpable lymphadenopathy; thyroid without palpable nodules  Lungs: symmetric chest rise; no wheezing  CV: Good distal perfusion; normal hear rate  Extremities: No deformity  Neurologic Exam: Cranial nerves II-XII are grossly intact; no focal deficit    Procedure Note  Procedure performed: Rigid nasal endoscopy  Indication: To evaluate for sinonasal pathology not visualized on routine anterior rhinoscopy  Anesthesia: 4% topical lidocaine with 0.05% oxymetazoline  Description of procedure: A 30 degree, 3 mm rigid endoscope was inserted into bilateral nasal cavities and the nasal valves, nasal cavity, middle meatus, sphenoethmoid recess, and nasopharynx were thoroughly evaluated for evidence of obstruction, edema, purulence, polyps and/or mass/lesion.     Rich[place]-Mark Endoscopic Scoring System  Endoscopic observation Right Left   Polyps in middle meatus (0 = absent, 1 = restricted to middle meatus, 2 = Beyond middle meatus) 0 0   Discharge (0 = absent, 1 = thin and clear, 2 = thick, purulent) 0 1   Edema (0 = absent, 1 = mild-moderate, 2 = moderate-severe) 0 0   Crusting (0 = absent, 1 = mild-moderate, 2 = moderate-severe) 0 0   Scarring (0= absent, 1 = mild-moderate, 2 = moderate-severe) 0 0 "   Total 0 1     Findings  RT: sinonasal passages patent without evidence of infection, polyps, or mass.   LT: Evidence of thin drainage from the middle meatus into the nasopharynx.      The patient tolerated the procedure well without complication.     Assessment/Medical Decision Makin-year-old female who presents with possible recurrent left-sided sinusitis versus underlying neuropathy possibly from tooth removal/chronic sinus infection    On endoscopy does have some thin drainage which I cultured today.  Will plan for a sinus CT scan after patient has been off the rinses for a few days to see what it looks like while she is actively symptomatic    If demonstrates evidence of a sinus infection we will plan to treat with extended course of antibiotics and oral steroids at the same time.  No evidence of any sinus condition did discuss with patient that we may consider something like a neurology referral    Order a CT scan for 2 weeks from now.    Plan:  Follow-up based on CT scan results    Jens Fernández MD    [facility]  Rhinology  Endoscopic Skull Base Surgery  [facility]  Department of Otolaryngology - Head & Neck Surgery

## 2024-11-02 LAB — BACTERIA SINUS CULT: NORMAL

## 2024-11-07 LAB — BACTERIA SINUS CULT: ABNORMAL

## 2024-11-08 ENCOUNTER — ANCILLARY PROCEDURE (OUTPATIENT)
Dept: CT IMAGING | Facility: CLINIC | Age: 49
End: 2024-11-08
Attending: STUDENT IN AN ORGANIZED HEALTH CARE EDUCATION/TRAINING PROGRAM
Payer: COMMERCIAL

## 2024-11-08 DIAGNOSIS — J01.91 ACUTE RECURRENT SINUSITIS, UNSPECIFIED LOCATION: ICD-10-CM

## 2024-11-08 PROCEDURE — 70486 CT MAXILLOFACIAL W/O DYE: CPT | Performed by: RADIOLOGY

## 2024-11-15 ENCOUNTER — TELEPHONE (OUTPATIENT)
Dept: OTOLARYNGOLOGY | Facility: CLINIC | Age: 49
End: 2024-11-15
Payer: COMMERCIAL

## 2024-11-15 DIAGNOSIS — J34.89 SINUS PAIN: Primary | ICD-10-CM

## 2024-11-15 NOTE — TELEPHONE ENCOUNTER
Writer called and spoke to pt regarding recent CT scan. Writer stated that Dr. Fernnádez reviewed the CT scan and stated that there was no evidence of infection or chronic sinusitis. Writer stated that Dr. Fernández recommended a referral to neurology to address tingling/numbness over left side of face. Pt read scan report and questioning what a retention cyst is and if something needs to be done. Writer stated that retention are benign and come and go on their own, no treatment necessary. Pt wondering why a referral to neurology, writer stated that since pt is still having the facial symptoms but does not have an active infection neurology would be the best to address these symptoms. Pt expressed understanding.    Cathi Mansfield RN

## 2025-01-07 NOTE — TELEPHONE ENCOUNTER
RECORDS RECEIVED FROM: Internal    REASON FOR VISIT: facial tingling and numbness over left brow   PROVIDER: Wally Duarte DO   DATE OF APPT: 4/2/25 @ 1:00 pm    NOTES (FOR ALL VISITS) STATUS DETAILS   OFFICE NOTE from referring provider Internal 10/31/24 Jens Fernández MD @[facility]-ENT     OFFICE NOTE from other specialist Internal 9/13/24 Dayanara Ruiz MD @[facility]     MEDICATION LIST Internal    IMAGING  (FOR ALL VISITS)     CT (HEAD, NECK, SPINE) Internal [facility]  11/8/24 CT Sinus  7/8/24 CT Sinus

## 2025-01-19 ENCOUNTER — HEALTH MAINTENANCE LETTER (OUTPATIENT)
Age: 50
End: 2025-01-19

## 2025-02-21 ENCOUNTER — ANCILLARY PROCEDURE (OUTPATIENT)
Dept: ULTRASOUND IMAGING | Facility: CLINIC | Age: 50
End: 2025-02-21
Attending: NURSE PRACTITIONER
Payer: COMMERCIAL

## 2025-02-21 DIAGNOSIS — N95.0 POSTMENOPAUSAL BLEEDING: ICD-10-CM

## 2025-02-21 PROCEDURE — 76856 US EXAM PELVIC COMPLETE: CPT | Mod: GC | Performed by: STUDENT IN AN ORGANIZED HEALTH CARE EDUCATION/TRAINING PROGRAM

## 2025-02-21 PROCEDURE — 76830 TRANSVAGINAL US NON-OB: CPT | Mod: GC | Performed by: STUDENT IN AN ORGANIZED HEALTH CARE EDUCATION/TRAINING PROGRAM

## 2025-04-02 ENCOUNTER — PRE VISIT (OUTPATIENT)
Dept: NEUROLOGY | Facility: CLINIC | Age: 50
End: 2025-04-02

## 2025-04-13 ENCOUNTER — OFFICE VISIT (OUTPATIENT)
Dept: URGENT CARE | Facility: URGENT CARE | Age: 50
End: 2025-04-13
Payer: COMMERCIAL

## 2025-04-13 VITALS
TEMPERATURE: 97.9 F | OXYGEN SATURATION: 98 % | BODY MASS INDEX: 31.58 KG/M2 | HEIGHT: 64 IN | DIASTOLIC BLOOD PRESSURE: 80 MMHG | HEART RATE: 85 BPM | SYSTOLIC BLOOD PRESSURE: 120 MMHG | WEIGHT: 185 LBS | RESPIRATION RATE: 19 BRPM

## 2025-04-13 DIAGNOSIS — H92.01 RIGHT EAR PAIN: Primary | ICD-10-CM

## 2025-04-13 PROCEDURE — 3079F DIAST BP 80-89 MM HG: CPT | Performed by: FAMILY MEDICINE

## 2025-04-13 PROCEDURE — 99213 OFFICE O/P EST LOW 20 MIN: CPT | Performed by: FAMILY MEDICINE

## 2025-04-13 PROCEDURE — 3074F SYST BP LT 130 MM HG: CPT | Performed by: FAMILY MEDICINE

## 2025-04-13 PROCEDURE — 1125F AMNT PAIN NOTED PAIN PRSNT: CPT | Performed by: FAMILY MEDICINE

## 2025-04-13 RX ORDER — DOXYCYCLINE 100 MG/1
100 CAPSULE ORAL 2 TIMES DAILY
Qty: 20 CAPSULE | Refills: 0 | Status: SHIPPED | OUTPATIENT
Start: 2025-04-13 | End: 2025-04-23

## 2025-04-13 ASSESSMENT — ENCOUNTER SYMPTOMS
FACIAL SWELLING: 1
HEADACHES: 1
SWOLLEN GLANDS: 1

## 2025-04-13 ASSESSMENT — PAIN SCALES - GENERAL: PAINLEVEL_OUTOF10: MILD PAIN (3)

## 2025-04-13 NOTE — PROGRESS NOTES
"Rooming Notes:    Patient presents with:  Urgent Care: Ear ache right ear ( partial on face)           Physician Note:    Assessment/MDM:    Lucie Robertson is a 49 year old female is here today for right ear pain suspect early otitis.  I am going to treat with antibiotics follow-up if fails to improve in the next 3 to 5 days.       HPI:  URI  This is a new problem. The current episode started 1 to 4 weeks ago. The problem occurs constantly. The problem has been unchanged. Associated symptoms include headaches and swollen glands.        Review of Systems   HENT:  Positive for ear pain and facial swelling.    Neurological:  Positive for headaches.   All other systems reviewed and are negative.      Vitals:    04/13/25 0918   BP: 120/80   BP Location: Right arm   Patient Position: Sitting   Cuff Size: Adult Regular   Pulse: 85   Resp: 19   Temp: 97.9  F (36.6  C)   TempSrc: Temporal   SpO2: 98%   Weight: 83.9 kg (185 lb)   Height: 1.626 m (5' 4\")       Physical Exam  Vitals and nursing note reviewed.   HENT:      Right Ear: A middle ear effusion is present. Tympanic membrane is erythematous and bulging.   Cardiovascular:      Rate and Rhythm: Normal rate.   Neurological:      Mental Status: She is alert.       Results:  No results found for any visits on 04/13/25.        Past Medical History: has been reviewed by me. I have also reviewed past visits, lab results and studies  Adverse Drug Reactions: Sulfa antibiotics    Medications: reviewed by me today    Family History: Reviewed by me today  Social History:   Social History     Tobacco Use    Smoking status: Never    Smokeless tobacco: Never   Substance Use Topics    Alcohol use: Not on file       Tobacco:   History   Smoking Status    Never   Smokeless Tobacco    Never         I have reviewed and recommended any over-the-counter medications that will aid in the symptomatic relief of this illness.    The risk of complications, morbidity, and/or mortality of patient " management decisions were made during the visit with the patient. These may be associated with the patient s problems, the diagnostic procedures, or the treatment. This includes possible management options selected, as well options considered but ultimately not selected, after shared medical decision making with the patient and/or family.        ICD-10-CM    1. Right ear pain  H92.01 doxycycline hyclate (VIBRAMYCIN) 100 MG capsule           Randa Spears MD  4/13/2025, 4:34 PM.      Patient Instructions   Supportive care:    Continue to rest, get plenty of fluids and watch for any change or new symptoms. If you develop new fevers, worsening symptoms or any new concerning symptoms- return for reevaluation. Most viral illnesses  take 7-10 days to fully resolve. The key is often watchful waiting to see if you are improving.    If you have a fever: You may take fever reducers such as Tylenol or ibuprofen. Children under 6-month-old should not be given ibuprofen.     For cough & congestion:    You may also try a humidifier, Vicks Vapo-rub, Benadryl at night or Claritin / Zyrtec/ Allegra during the daytime.  If you have sinus pressure or nasal congestion you can get nasal saline spray or steroid nasal sprays like Flonase or nasonex can help. Nasal steroids take up to 10-14 days to see effects, so don't give up!  Some people find relief with use of a [place] pot, however if you choose to use a [place] pot you should only use it with distilled water.    Honey is good for nighttime cough. Honey should not be given to children under 1 year of age.    For sore throat:  Throat lozenges can help sore throat, so can tea or teaspoon of honey. Marjorie is also helpful in tea.  Gargling with warm salt water can be soothing to inflamed throat tissue.    OF NOTE:  If you have had a history of acid reflux or a GI bleed or gastric bypass or if any other reason you have been told you should not take Tylenol or Ibuprofen, do not use these  medications. If you have had an allergic reaction to any of these medications do not take them.

## 2025-04-13 NOTE — PATIENT INSTRUCTIONS
Supportive care:    Continue to rest, get plenty of fluids and watch for any change or new symptoms. If you develop new fevers, worsening symptoms or any new concerning symptoms- return for reevaluation. Most viral illnesses  take 7-10 days to fully resolve. The key is often watchful waiting to see if you are improving.    If you have a fever: You may take fever reducers such as Tylenol or ibuprofen. Children under 6-month-old should not be given ibuprofen.     For cough & congestion:    You may also try a humidifier, Vicks Vapo-rub, Benadryl at night or Claritin / Zyrtec/ Allegra during the daytime.  If you have sinus pressure or nasal congestion you can get nasal saline spray or steroid nasal sprays like Flonase or nasonex can help. Nasal steroids take up to 10-14 days to see effects, so don't give up!  Some people find relief with use of a [place] pot, however if you choose to use a [place] pot you should only use it with distilled water.    Honey is good for nighttime cough. Honey should not be given to children under 1 year of age.    For sore throat:  Throat lozenges can help sore throat, so can tea or teaspoon of honey. Marjorie is also helpful in tea.  Gargling with warm salt water can be soothing to inflamed throat tissue.    OF NOTE:  If you have had a history of acid reflux or a GI bleed or gastric bypass or if any other reason you have been told you should not take Tylenol or Ibuprofen, do not use these medications. If you have had an allergic reaction to any of these medications do not take them.

## 2025-04-13 NOTE — PROGRESS NOTES
Urgent Care Clinic Visit    Chief Complaint   Patient presents with    Urgent Care     Ear ache right ear ( partial on face)                  4/13/2025     9:17 AM   Additional Questions   Roomed by janie daley   Accompanied by ezra         4/13/2025     9:17 AM   Patient Reported Additional Medications   Patient reports taking the following new medications n/a

## 2025-04-29 ENCOUNTER — ANCILLARY PROCEDURE (OUTPATIENT)
Dept: MRI IMAGING | Facility: CLINIC | Age: 50
End: 2025-04-29
Attending: NURSE PRACTITIONER
Payer: COMMERCIAL

## 2025-04-29 DIAGNOSIS — M54.42 ACUTE LEFT-SIDED LOW BACK PAIN WITH LEFT-SIDED SCIATICA: ICD-10-CM

## 2025-04-29 PROCEDURE — A9585 GADOBUTROL INJECTION: HCPCS | Performed by: STUDENT IN AN ORGANIZED HEALTH CARE EDUCATION/TRAINING PROGRAM

## 2025-04-29 PROCEDURE — 72158 MRI LUMBAR SPINE W/O & W/DYE: CPT | Performed by: STUDENT IN AN ORGANIZED HEALTH CARE EDUCATION/TRAINING PROGRAM

## 2025-04-29 RX ORDER — GADOBUTROL 604.72 MG/ML
10 INJECTION INTRAVENOUS ONCE
Status: COMPLETED | OUTPATIENT
Start: 2025-04-29 | End: 2025-04-29

## 2025-04-29 RX ADMIN — GADOBUTROL 8 ML: 604.72 INJECTION INTRAVENOUS at 18:28

## 2025-06-10 ENCOUNTER — TRANSCRIBE ORDERS (OUTPATIENT)
Dept: OTHER | Age: 50
End: 2025-06-10

## 2025-06-10 ENCOUNTER — MEDICAL CORRESPONDENCE (OUTPATIENT)
Dept: HEALTH INFORMATION MANAGEMENT | Facility: CLINIC | Age: 50
End: 2025-06-10
Payer: COMMERCIAL

## 2025-06-10 DIAGNOSIS — R07.89 CHEST TIGHTNESS: Primary | ICD-10-CM

## 2025-06-10 DIAGNOSIS — R05.8 COUGH WITH SPUTUM: ICD-10-CM

## 2025-06-19 DIAGNOSIS — R05.8 COUGH WITH SPUTUM: ICD-10-CM

## 2025-06-19 DIAGNOSIS — R07.89 CHEST TIGHTNESS: ICD-10-CM

## 2025-06-19 LAB
DLCOUNC-%PRED-PRE: 98 %
DLCOUNC-PRE: 20.22 ML/MIN/MMHG
DLCOUNC-PRED: 20.53 ML/MIN/MMHG
ERV-%PRED-PRE: 19 %
ERV-PRE: 0.22 L
ERV-PRED: 1.16 L
EXPTIME-PRE: 5.8 SEC
FEF2575-%PRED-PRE: 97 %
FEF2575-PRE: 2.49 L/SEC
FEF2575-PRED: 2.54 L/SEC
FEFMAX-%PRED-PRE: 94 %
FEFMAX-PRE: 6.34 L/SEC
FEFMAX-PRED: 6.7 L/SEC
FEV1-%PRED-PRE: 97 %
FEV1-PRE: 2.49 L
FEV1FEV6-PRE: 82 %
FEV1FEV6-PRED: 82 %
FEV1FVC-PRE: 83 %
FEV1FVC-PRED: 81 %
FEV1SVC-PRE: 85 %
FEV1SVC-PRED: 76 %
FIFMAX-PRE: 4.18 L/SEC
FRCPLETH-%PRED-PRE: 65 %
FRCPLETH-PRE: 1.85 L
FRCPLETH-PRED: 2.83 L
FVC-%PRED-PRE: 95 %
FVC-PRE: 3 L
FVC-PRED: 3.15 L
IC-%PRED-PRE: 115 %
IC-PRE: 2.69 L
IC-PRED: 2.32 L
RVPLETH-%PRED-PRE: 92 %
RVPLETH-PRE: 1.62 L
RVPLETH-PRED: 1.76 L
TLCPLETH-%PRED-PRE: 91 %
TLCPLETH-PRE: 4.54 L
TLCPLETH-PRED: 4.94 L
VA-%PRED-PRE: 87 %
VA-PRE: 4.17 L
VC-%PRED-PRE: 86 %
VC-PRE: 2.91 L
VC-PRED: 3.37 L

## 2025-06-26 ENCOUNTER — TRANSCRIBE ORDERS (OUTPATIENT)
Dept: OTHER | Age: 50
End: 2025-06-26

## 2025-06-26 DIAGNOSIS — R00.2 PALPITATIONS: Primary | ICD-10-CM

## 2025-06-30 ENCOUNTER — ANCILLARY PROCEDURE (OUTPATIENT)
Dept: MAMMOGRAPHY | Facility: CLINIC | Age: 50
End: 2025-06-30
Payer: COMMERCIAL

## 2025-06-30 DIAGNOSIS — Z12.31 VISIT FOR SCREENING MAMMOGRAM: ICD-10-CM

## 2025-06-30 PROCEDURE — 77067 SCR MAMMO BI INCL CAD: CPT | Performed by: STUDENT IN AN ORGANIZED HEALTH CARE EDUCATION/TRAINING PROGRAM

## 2025-06-30 PROCEDURE — 77063 BREAST TOMOSYNTHESIS BI: CPT | Performed by: STUDENT IN AN ORGANIZED HEALTH CARE EDUCATION/TRAINING PROGRAM

## 2025-07-22 ENCOUNTER — HOSPITAL ENCOUNTER (OUTPATIENT)
Dept: CARDIOLOGY | Facility: CLINIC | Age: 50
Discharge: HOME | End: 2025-07-22
Attending: FAMILY MEDICINE
Payer: COMMERCIAL

## 2025-07-22 DIAGNOSIS — R00.2 PALPITATIONS: ICD-10-CM

## 2025-07-22 DIAGNOSIS — R07.89 CHEST TIGHTNESS: ICD-10-CM

## 2025-07-22 LAB — LVEF ECHO: NORMAL

## 2025-07-22 PROCEDURE — 93306 TTE W/DOPPLER COMPLETE: CPT | Mod: 26 | Performed by: INTERNAL MEDICINE

## 2025-07-22 PROCEDURE — 93306 TTE W/DOPPLER COMPLETE: CPT

## 2025-07-22 PROCEDURE — 93225 XTRNL ECG REC<48 HRS REC: CPT

## (undated) RX ORDER — LIDOCAINE HYDROCHLORIDE 10 MG/ML
INJECTION, SOLUTION INFILTRATION; PERINEURAL
Status: DISPENSED
Start: 2023-12-08

## (undated) RX ORDER — FENTANYL CITRATE 50 UG/ML
INJECTION, SOLUTION INTRAMUSCULAR; INTRAVENOUS
Status: DISPENSED
Start: 2023-07-24